# Patient Record
Sex: MALE | Race: WHITE | NOT HISPANIC OR LATINO | Employment: UNEMPLOYED
[De-identification: names, ages, dates, MRNs, and addresses within clinical notes are randomized per-mention and may not be internally consistent; named-entity substitution may affect disease eponyms.]

---

## 2022-03-09 ENCOUNTER — TRANSCRIBE ORDERS (OUTPATIENT)
Dept: OTHER | Age: 42
End: 2022-03-09

## 2022-03-09 DIAGNOSIS — K70.31 ASCITES DUE TO ALCOHOLIC CIRRHOSIS (H): Primary | ICD-10-CM

## 2022-03-23 ENCOUNTER — TELEPHONE (OUTPATIENT)
Dept: GASTROENTEROLOGY | Facility: CLINIC | Age: 42
End: 2022-03-23
Payer: COMMERCIAL

## 2022-03-23 DIAGNOSIS — K74.60 CIRRHOSIS OF LIVER (H): Primary | ICD-10-CM

## 2022-03-23 NOTE — TELEPHONE ENCOUNTER
GOLDENM for PT to call [phone] to move up the 4.19 Dr. Wade appt to 4.5 @10:00.  Labs would need to be done the day before.  We can fax that order in.

## 2022-03-23 NOTE — TELEPHONE ENCOUNTER
Dr. Chase - thanks for the heads-up. He's scheduled for April 19 with me, but I'll have my RN coordinator contact him and see if he can come in sooner (e.g. the 1st week in April).     I don't know that I can see his most recent labs in [facility], but I'm concerned about his low sodium in February - I'd suggesting following his CMP.     Feel free to call my RN coordinator, Cinthya Pickering at [phone] for any questions about potential referrals - or for urgent issues, you can call [phone] and ask to speak to the on-call Hepatologist.     Best,     Deangelo Salinas MD   ([facility] Hepatology [facility])     And:    From: Guillermo Salinas MD ([place])  Sent: 3/23/2022  10:08 AM CDT  To: Cinthya Pickering RN ([place])  Subject: FW: Referral on 3/22/2022    Cinthya - could you please reach out to this patient and see if he wants to come in sooner (e.g. the 10 AM slot on April 5)?  His labs don't look good.  Please have him go to a [facility] clinic the day prior to his visit with me for a CMP, INR, CBC, HCV Ab, Hep B sAg/Ab  Also - please have a SW contact him about getting some kind of medical coverage (e.g.  MA) if he doesn't already have this.    Thanks,  J    Will ask scheduling to please move up his hepatology visit to first week of April, if he can, as well as get labs.

## 2022-04-04 ENCOUNTER — LAB (OUTPATIENT)
Dept: LAB | Facility: CLINIC | Age: 42
End: 2022-04-04
Payer: COMMERCIAL

## 2022-04-04 DIAGNOSIS — K70.31 ALCOHOLIC CIRRHOSIS OF LIVER WITH ASCITES (H): ICD-10-CM

## 2022-04-04 DIAGNOSIS — K74.60 CIRRHOSIS OF LIVER (H): ICD-10-CM

## 2022-04-04 LAB
ERYTHROCYTE [DISTWIDTH] IN BLOOD BY AUTOMATED COUNT: 14.1 % (ref 10–15)
HCT VFR BLD AUTO: 44.1 % (ref 40–53)
HGB BLD-MCNC: 15.7 G/DL (ref 13.3–17.7)
INR PPP: 1.31 (ref 0.85–1.15)
MCH RBC QN AUTO: 36.3 PG (ref 26.5–33)
MCHC RBC AUTO-ENTMCNC: 35.6 G/DL (ref 31.5–36.5)
MCV RBC AUTO: 102 FL (ref 78–100)
PLATELET # BLD AUTO: 323 10E3/UL (ref 150–450)
RBC # BLD AUTO: 4.33 10E6/UL (ref 4.4–5.9)
WBC # BLD AUTO: 12.5 10E3/UL (ref 4–11)

## 2022-04-04 PROCEDURE — 87340 HEPATITIS B SURFACE AG IA: CPT

## 2022-04-04 PROCEDURE — 85027 COMPLETE CBC AUTOMATED: CPT

## 2022-04-04 PROCEDURE — 36415 COLL VENOUS BLD VENIPUNCTURE: CPT

## 2022-04-04 PROCEDURE — 84165 PROTEIN E-PHORESIS SERUM: CPT

## 2022-04-04 PROCEDURE — 80053 COMPREHEN METABOLIC PANEL: CPT

## 2022-04-04 PROCEDURE — 84155 ASSAY OF PROTEIN SERUM: CPT

## 2022-04-04 PROCEDURE — 82248 BILIRUBIN DIRECT: CPT

## 2022-04-04 PROCEDURE — 86706 HEP B SURFACE ANTIBODY: CPT

## 2022-04-04 PROCEDURE — 85610 PROTHROMBIN TIME: CPT

## 2022-04-04 PROCEDURE — 86803 HEPATITIS C AB TEST: CPT

## 2022-04-05 ENCOUNTER — TELEPHONE (OUTPATIENT)
Dept: GASTROENTEROLOGY | Facility: CLINIC | Age: 42
End: 2022-04-05

## 2022-04-05 ENCOUNTER — OFFICE VISIT (OUTPATIENT)
Dept: GASTROENTEROLOGY | Facility: CLINIC | Age: 42
End: 2022-04-05
Attending: FAMILY MEDICINE
Payer: COMMERCIAL

## 2022-04-05 VITALS
HEIGHT: 69 IN | BODY MASS INDEX: 28.2 KG/M2 | OXYGEN SATURATION: 98 % | DIASTOLIC BLOOD PRESSURE: 91 MMHG | HEART RATE: 130 BPM | WEIGHT: 190.4 LBS | SYSTOLIC BLOOD PRESSURE: 131 MMHG

## 2022-04-05 DIAGNOSIS — K70.31 ASCITES DUE TO ALCOHOLIC CIRRHOSIS (H): ICD-10-CM

## 2022-04-05 LAB
ALBUMIN SERPL-MCNC: 2.7 G/DL (ref 3.4–5)
ALP SERPL-CCNC: 194 U/L (ref 40–150)
ALT SERPL W P-5'-P-CCNC: 39 U/L (ref 0–70)
ANION GAP SERPL CALCULATED.3IONS-SCNC: 9 MMOL/L (ref 3–14)
AST SERPL W P-5'-P-CCNC: 59 U/L (ref 0–45)
BILIRUB DIRECT SERPL-MCNC: 0.4 MG/DL (ref 0–0.2)
BILIRUB SERPL-MCNC: 0.8 MG/DL (ref 0.2–1.3)
BUN SERPL-MCNC: 8 MG/DL (ref 7–30)
CALCIUM SERPL-MCNC: 9.5 MG/DL (ref 8.5–10.1)
CHLORIDE BLD-SCNC: 96 MMOL/L (ref 94–109)
CO2 SERPL-SCNC: 23 MMOL/L (ref 20–32)
CREAT SERPL-MCNC: 0.55 MG/DL (ref 0.66–1.25)
GFR SERPL CREATININE-BSD FRML MDRD: >90 ML/MIN/1.73M2
GLUCOSE BLD-MCNC: 177 MG/DL (ref 70–99)
HBV SURFACE AB SERPL IA-ACNC: >1000 M[IU]/ML
HBV SURFACE AG SERPL QL IA: NONREACTIVE
HCV AB SERPL QL IA: NONREACTIVE
POTASSIUM BLD-SCNC: 4.2 MMOL/L (ref 3.4–5.3)
PROT SERPL-MCNC: 9 G/DL (ref 6.8–8.8)
SODIUM SERPL-SCNC: 128 MMOL/L (ref 133–144)

## 2022-04-05 PROCEDURE — 99205 OFFICE O/P NEW HI 60 MIN: CPT | Performed by: INTERNAL MEDICINE

## 2022-04-05 RX ORDER — SPIRONOLACTONE 50 MG/1
TABLET, FILM COATED ORAL
COMMUNITY
Start: 2022-02-18

## 2022-04-05 RX ORDER — FAMOTIDINE 20 MG/1
20 TABLET, FILM COATED ORAL
COMMUNITY
Start: 2022-02-02

## 2022-04-05 RX ORDER — ACAMPROSATE CALCIUM 333 MG/1
TABLET, DELAYED RELEASE ORAL
COMMUNITY
Start: 2022-03-29

## 2022-04-05 RX ORDER — FUROSEMIDE 20 MG
TABLET ORAL
COMMUNITY
Start: 2022-02-18

## 2022-04-05 RX ORDER — EPINEPHRINE 0.3 MG/.3ML
0.3 INJECTION SUBCUTANEOUS
COMMUNITY
Start: 2021-07-01

## 2022-04-05 RX ORDER — METFORMIN HCL 500 MG
TABLET, EXTENDED RELEASE 24 HR ORAL
COMMUNITY
Start: 2021-10-27

## 2022-04-05 RX ORDER — QUETIAPINE FUMARATE 50 MG/1
TABLET, FILM COATED ORAL
COMMUNITY
Start: 2022-03-29

## 2022-04-05 RX ORDER — LORAZEPAM 0.5 MG/1
0.5 TABLET ORAL 4 TIMES DAILY PRN
COMMUNITY
Start: 2021-09-15

## 2022-04-05 ASSESSMENT — PAIN SCALES - GENERAL: PAINLEVEL: NO PAIN (0)

## 2022-04-05 NOTE — NURSING NOTE
"Chief Complaint   Patient presents with     New Patient     Acsites due to alcoholic cirrhosis       Vitals:    04/05/22 1004   BP: (!) 131/91   BP Location: Right arm   Patient Position: Sitting   Cuff Size: Adult Regular   Pulse: (!) 130   SpO2: 98%   Weight: 86.4 kg (190 lb 6.4 oz)   Height: 1.753 m (5' 9\")       Body mass index is 28.12 kg/m .    Lorna Chance MA    "

## 2022-04-05 NOTE — TELEPHONE ENCOUNTER
4/5/22 Hepatology visit with Dr. Salinas reviewed and care coordination needed includes:    Endoscopy here in the relatively near future.     Repeat MRI here (with labs and a clinic visit) in August.     Will assist patient as able in having recommended follow-up.    Of special note, sent imaging disk from Ezio' previous images from an outside facility to [facility] to be scanned into his chart.

## 2022-04-05 NOTE — PROGRESS NOTES
"[facility]       Hepatology Clinic    Date of Service: 4/5/2022       Primary Care Provider: Jeremy Chase    History of Present Illness     Mr. Oquendo is sent in consultation by Dr. Chase because of alcoholic cirrhosis with ascites. He is accompanied by his father.    The patient reports a longstanding history of substantial alcohol use.  Since his original presentation in February, he has \"cut down\" his alcohol use and now drinks approximately 5 to 6 glasses of wine daily.    The patient states that he was told that he had an enlarged liver in about 2017 when he underwent an appendectomy.  He presented in February 2022 with ascites, and has undergone several paracenteses.  He is also recently been started on spironolactone (100 mg) and Lasix (40 mg) daily.  His last paracentesis was about 4 weeks ago, and he has symptomatic ascites at present.  He has not noted lower extremity edema recently.  He also reports no history of GI bleeding or overt encephalopathy symptoms.    He reports early satiety that has worsened since the onset of his ascites.  He also notes muscle weakness.    He has a history of significant anxiety and trauma from the recent death of his brother.  He reports that he used alcohol to treat anxiety in the past.  He had been employed as a  at the [employer], but quit in July because the job is so stressful.  He lives alone, but has a supportive family.    There is no family history of liver disease, but there is a strong family history of alcoholism.  The patient recently started seeing a mental health provider close to home, and has been started on acamprosate.    I reviewed his Problem List and PMN from [facility].    Past Medical History:  No past medical history on file.    There is no problem list on file for this patient.      Surgical History:  No past surgical history on file.    Social History:  Social History     Tobacco Use     " "Smoking status: Current Every Day Smoker     Packs/day: 0.50     Smokeless tobacco: Never Used   Substance Use Topics     Alcohol use: Yes     Alcohol/week: 40.0 standard drinks     Types: 40 Glasses of wine per week     Drug use: Never       Family History:  No family history on file.    Medications:  Current Outpatient Medications   Medication     acamprosate (CAMPRAL) 333 MG EC tablet     ASPIRIN 81 PO     EPINEPHrine (ANY BX GENERIC EQUIV) 0.3 MG/0.3ML injection 2-pack     famotidine (PEPCID) 20 MG tablet     furosemide (LASIX) 20 MG tablet     LORazepam (ATIVAN) 0.5 MG tablet     metFORMIN (GLUCOPHAGE-XR) 500 MG 24 hr tablet     QUEtiapine (SEROQUEL) 50 MG tablet     spironolactone (ALDACTONE) 50 MG tablet     No current facility-administered medications for this visit.         Objective:         Vitals:    04/05/22 1004   BP: (!) 131/91   BP Location: Right arm   Patient Position: Sitting   Cuff Size: Adult Regular   Pulse: (!) 130   SpO2: 98%   Weight: 86.4 kg (190 lb 6.4 oz)   Height: 1.753 m (5' 9\")     Body mass index is 28.12 kg/m .     Physical Exam  Constitutional: Well-developed, well-nourished, in no apparent distress.    HEENT: Normocephalic. No scleral icterus. Moist oral mucosa. Poor dentition.  Cardiac:  Regular rate and rhythm.  No overt murmurs  Respiratory: Clear to auscultation bilaterally.  No wheezes or rales  GI:  Abdomen soft,  non-tender. BS present. Distended with ascites. Enlarged liver.    Skin:  Rash over forehead and face (likely acne).  No spider nevi noted.    Peripheral Vascular: No lower extremity edema.   Musculoskeletal:  ROM intact, apparently decreased muscle bulk    Psychiatric: Normal mood and affect. Behavior is normal.  Neuro: Fine tremor that may be related to some degree of alcohol withdrawal.    Labs and Diagnostic tests:    Lab Results   Component Value Date    WBC 12.5 04/04/2022    HGB 15.7 04/04/2022     04/04/2022     04/04/2022     Lab Results "   Component Value Date    INR 1.31 04/04/2022       MRI in [facility] Feb. 2022  IMPRESSION:   1. Findings consistent with hepatic cirrhosis with component acute   inflammation/hepatitis.   2. Heterogenous diffuse arterial and venous enhancement patterns throughout the   liver favored to be secondary to a chronic liver disease and acute inflammatory   process.  However, patient is likely at high risk of development of HCC and   would benefit from multiphasic screening CT or MRI following control of acute   hepatic inflammation.   3. Perihepatic adenopathy likely reactive to the underlying hepatocellular   disease.   4. Moderate perihepatic and parasplenic predominant ascites.    Hepatitis A, B, and C serologies were negative in 2017.    Assessment and Plan:    1.  Alcoholic cirrhosis with ascites.  There may also be a component of superimposed acute alcoholic steatohepatitis given the appearance of his MRI scan and his hepatomegaly today, which may improve over time.    The significance of cirrhosis was discussed at length with the patient and his father.  They understand that any level of ongoing alcohol use substantially increases his risk of further decompensation and potentially death.  I strongly urged the patient to continue to work with his providers in [facility] regarding outpatient and potentially inpatient chemical dependency treatment.  The patient states he he will continue to follow-up with these providers.  If he is able to stop drinking, there is a reasonable chance that his liver will slowly improve over time.    In regard to his ascites management, if his CMP today does not show any worrisome features, I think it would be reasonable to increase his spironolactone to 200 mg daily and Lasix 40 mg twice daily.  I would monitor his CMP at least monthly while he is on these medications (more often if there are any significant abnormalities).  He was significantly hyponatremic in March, and this will need to be  followed closely.    The risk of further decompensation, including worsening fluid accumulation, GI bleeding, encephalopathy, and liver cancer were discussed.  I am suggesting that we perform an endoscopy on him in the relatively near future to screen for significant varices, and to perform banding if these look high-risk.  Given the appearance of his MRI in February, I think it is reasonable for us to repeat this in August when he returns to see us in clinic.    He appears to be well-cared for in a can by Dr. Chase and his mental health provider.  I think it is reasonable to perform paracentesis (with IV albumin) as needed to control symptoms.  I also suggest that he be referred to a dietitian in his community regarding a low-sodium diet, as well as achieving adequate calorie protein intake.  I recommended that he gradually increase his exercise activity level to overcome the muscle atrophy that is occurred due to his inactivity and illness.    Their questions today were answered.  I will plan on seeing him back in August, but he can return sooner if needed for worsening hepatic or GI symptoms.  I would be happy to discuss the case with Dr. Chase.      About 62 minutes spent today with patient, reviewing results, and coordinating care.        Guillermo Salinas MD  Professor of Medicine  [facility]  Division of Gastroenterology, Hepatology, and Nutrition

## 2022-04-05 NOTE — PATIENT INSTRUCTIONS
It was a pleasure taking care of you today. I've included a brief summary of our discussion and care plan from today's visit below.  Please review this information with your primary care provider.  _______________________________________________________________________    My recommendations are summarized as follows:    1. Complete alcohol cessation is extremely important. Please work with your providers at home about outpatient and/or inpatient alcohol treatment.    2. Paracentesis as needed in [place].    3. Endoscopy here in the relatively near future.    4. Repeat MRI here (with labs and a clinic visit) in August.    5. Contact us if you have worsening liver or GI symptoms. If you need hospitalization, I would prefer that you were at the [facility] if you need advanced care.    6. Low salt-low sodium diet. Avoid excess water or fluid intake. You may tolerate soft or liquid foods, and frequent small meals, better than large meals. I would consider asking your physician to refer you to a dietician.      If you need any follow-up appointments, please use the following phone numbers below.    To schedule or reschedule a follow-up GI appointment, call [phone] option 1    To schedule your endoscopy procedure, call [phone] option 2    To schedule imaging, please call [phone]     To schedule your lab appointment at [facility] 1st floor lab call [phone]. Call your [place] lab directly if it is not [facility]. If you use a non-[place] lab, please let us know where to fax your lab order (call Cinthya at [phone]).      _______________________________________________________________________    Please be in touch if there are any further questions that arise following today's visit.  There are multiple ways to contact your gastroenterology care team.      During business hours, you may reach your gastroenterology RN Care Coordinator, Cinthya Pickering, at  [phone].      You can always send a secure message through SPARQCode. SPARQCode messages are answered by your nurse or doctor typically within 24 hours. Please allow extra time on weekends and holidays.     What is SPARQCode?  SPARQCode is a secure way for you to access all of your healthcare records from the [facility].  It is a web based computer program, so you can sign on to it from any location.  It also allows you to send secure messages to your care team.  I recommend signing up for SPARQCode access if you have not already done so and are comfortable with using a computer.     For urgent/emergent questions after business hours, you may reach the on-call GI Fellow by contacting the [facility]  at [phone].     How will I get the results of any tests ordered?    You will receive all of your results.  If you have signed up for [employer], any tests ordered at your visit will be available to you after your physician reviews them.  Typically this takes 1-2 weeks.  If there are urgent results that require a change in your care plan, your physician or nurse will call you to discuss the next steps.      Thank you for choosing [facility]!       Sincerely,    Guillermo Salinas MD  Professor of Medicine  [facility]  Division of Gastroenterology, Hepatology, and Nutrition

## 2022-04-06 ENCOUNTER — TELEPHONE (OUTPATIENT)
Dept: GASTROENTEROLOGY | Facility: CLINIC | Age: 42
End: 2022-04-06
Payer: COMMERCIAL

## 2022-04-06 DIAGNOSIS — Z11.59 ENCOUNTER FOR SCREENING FOR OTHER VIRAL DISEASES: Primary | ICD-10-CM

## 2022-04-06 DIAGNOSIS — K70.31 ALCOHOLIC CIRRHOSIS OF LIVER WITH ASCITES (H): Primary | ICD-10-CM

## 2022-04-06 LAB — TOTAL PROTEIN SERUM FOR ELP: 8.4 G/DL (ref 6.8–8.8)

## 2022-04-06 NOTE — TELEPHONE ENCOUNTER
Screening Questions  BlueKIND OF PREP RedLOCATION [review exclusion criteria] GreenSEDATION TYPE  1. Have you had a positive covid test in the last 90 days? N     2. Are you active on mychart? N    3. What insurance is in the chart? BCBS     3.   Ordering/Referring Provider: Guillermo Salinas MD    4. BMI 28.1 [BMI OVER 40-EXTENDED PREP]  If greater than 40 review exclusion criteria [PAC APPT IF @ [facility]]        5.  Respiratory Screening :  [If yes to any of the following HOSPITAL setting only]     Do you use daily home oxygen? N    Do you have mod to severe Obstructive Sleep Apnea? N  [OKAY @ [facility] SH PH RI]   Do you have Pulmonary Hypertension? N     Do you have UNCONTROLLED asthma? N        6.   Have you had a heart or lung transplant? N      7.   Are you currently on dialysis? N [ If yes, G-PREP & HOSPITAL setting only]     8.   Do you have chronic kidney disease? N [ If yes, G-PREP ]    9.   Have you had a stroke or Transient ischemic attack (TIA - aka  mini stroke ) within 6 months?  N (If yes, please review exclusion criteria)    10.   In the past 6 months, have you had any heart related issues including cardiomyopathy or heart attack? N           If yes, did it require cardiac stenting or other implantable device? N      11.   Do you have any implantable devices in your body (pacemaker, defib, LVAD)? N (If yes, please review exclusion criteria)    12.   Do you take nitroglycerin? N           If yes, how often?   (if yes, HOSPITAL setting ONLY)    13.   Are you currently taking any blood thinners? N           [IF YES, INFORM PATIENT TO FOLLOW UP W/ ORDERING PROVIDER FOR BRIDGING INSTRUCTIONS]     14.   Do you have a diagnosis of diabetes? Y   [ If yes, G-PREP ]    15.   [FEMALES] Are you currently pregnant?     If yes, how many weeks?     16.   Are you taking any prescription pain medications on a routine schedule?  N  [ If yes, EXTENDED PREP.] [If yes, MAC]    17.   Do you have any chemical  dependencies such as alcohol, street drugs, or methadone?  Y [If yes, MAC] ALBRECT TALKED TO PT YESTERDAY AND OK'D CS ON ORDER    18.   Do you have any history of post-traumatic stress syndrome, severe anxiety or history of psychosis?  N  [If yes, MAC]    19.   Do you have a significant intellectual disability?  N [If yes, MAC]    20.   Do you transfer independently?  Y    21.  On a regular basis do you go 3-5 days between bowel movements?    [ If yes, EXTENDED PREP.]    22.   Preferred LOCAL Pharmacy for Pre Prescription   [id number] [place] [address]      Scheduling Details      Caller :   (Please ask for phone number if not scheduled by patient)    Type of Procedure Scheduled: EGD  Which Colonoscopy Prep was Sent?:   EMILY CF PATIENTS & GROEN'S PATIENTS NEEDS EXTENDED PREP  Surgeon: DANE  Date of Procedure: 5/9  Location: [facility]      Sedation Type: CS PER [facility]  Conscious Sedation- Needs  for 6 hours after the procedure  MAC/General-Needs  for 24 hours after procedure    Pre-op Required at [facility] [place], [place] and OR for MAC sedation:Y-patient was told he needed a pre op, but it is not needed since patient is having conscious sedation-LO  (advise patient they will need a pre-op prior to procedure -)      Informed patient they will need an adult  Y  Cannot take any type of public or medical transportation alone    Pre-Pro[facility] or Ex[facility]ally: PT WILL DO CLOSE TO HOME    Confirmed Nurse will call to complete assessment Y    Additional comments:  Patient called back concerned about pre op being done within 30 days.  I informed patient that he does not need to have a pre op done since he is having conscious sedation for anesthesia.  LO

## 2022-04-06 NOTE — PROGRESS NOTES
GI STAFF    His labs yesterday showed an elevated total protein and a low albumin.    I have added on a SPEP to these blood tests.        Lab Results   Component Value Date    AST 59 04/04/2022     Lab Results   Component Value Date    ALT 39 04/04/2022     No results found for: BILICONJ   Lab Results   Component Value Date    BILITOTAL 0.8 04/04/2022     Lab Results   Component Value Date    ALBUMIN 2.7 04/04/2022     Lab Results   Component Value Date    PROTTOTAL 9.0 04/04/2022      Lab Results   Component Value Date    ALKPHOS 194 04/04/2022

## 2022-04-07 LAB
ALBUMIN SERPL ELPH-MCNC: 3.3 G/DL (ref 3.7–5.1)
ALPHA1 GLOB SERPL ELPH-MCNC: 0.4 G/DL (ref 0.2–0.4)
ALPHA2 GLOB SERPL ELPH-MCNC: 0.6 G/DL (ref 0.5–0.9)
B-GLOBULIN SERPL ELPH-MCNC: 1.1 G/DL (ref 0.6–1)
GAMMA GLOB SERPL ELPH-MCNC: 2.9 G/DL (ref 0.7–1.6)
M PROTEIN SERPL ELPH-MCNC: 0 G/DL
PROT PATTERN SERPL ELPH-IMP: ABNORMAL

## 2022-04-11 NOTE — TELEPHONE ENCOUNTER
Imaging has been scanned in from outside facility into PACs. Will notify provider and also assist in patient having recommended August follow-up.

## 2022-04-29 ENCOUNTER — TELEPHONE (OUTPATIENT)
Dept: GASTROENTEROLOGY | Facility: CLINIC | Age: 42
End: 2022-04-29

## 2022-04-29 NOTE — TELEPHONE ENCOUNTER
Attempted to contact patient regarding upcoming EGD procedure on 5.9.2022 for pre assessment questions. No answer.     Left message to return call to [phone] #2    Covid test? External?    Arrival time: 0930    Facility location: [facility]    Sedation type: CS-approved by Dr. Salinas per scheduling notes    Indication for procedure: screening for varices    Alina Jorge RN

## 2022-05-04 ENCOUNTER — TELEPHONE (OUTPATIENT)
Dept: GASTROENTEROLOGY | Facility: CLINIC | Age: 42
End: 2022-05-04
Payer: COMMERCIAL

## 2022-05-04 NOTE — TELEPHONE ENCOUNTER
Second attempt for pre-assessment prior to upcoming EGD.    No answer.  Left message to return call [phone] #2    Alina Jorge RN

## 2022-05-04 NOTE — TELEPHONE ENCOUNTER
Pre assessment questions completed for upcoming EGD procedure scheduled on 5/9/22    COVID test scheduled: Has Covid test scheduled on 5/5/22 at [facility]. The Pt has our fax number of where to send result to, he will also bring in a hard copy with him to procedure.     Reviewed procedural arrival time 9:30am and facility location [facility].    Designated  policy reviewed. Instructed to have someone stay 6 hours post procedure.     Procedure indication: Screen for varices    Prep instructions sent via Postal Mail.    Reviewed EGD prep instructions with patient.     Anticoagulation/blood thinners? None    On Metformin, Pt will hold the morning of procedure.     Electronic implanted devices? None    Patient verbalized understanding and had no questions or concerns at this time.    Prema Culver RN

## 2022-05-04 NOTE — TELEPHONE ENCOUNTER
[facility] Call Center    Reason for Call: Other: Patient returning call for procedure PA     Action Taken: Patient transferred to: Prema Culver RN    Travel Screening: Not Applicable

## 2022-05-09 ENCOUNTER — HOSPITAL ENCOUNTER (OUTPATIENT)
Facility: CLINIC | Age: 42
Discharge: HOME | End: 2022-05-09
Attending: INTERNAL MEDICINE | Admitting: INTERNAL MEDICINE
Payer: COMMERCIAL

## 2022-05-09 VITALS
BODY MASS INDEX: 26.29 KG/M2 | TEMPERATURE: 99.2 F | RESPIRATION RATE: 16 BRPM | HEIGHT: 69 IN | SYSTOLIC BLOOD PRESSURE: 107 MMHG | WEIGHT: 177.47 LBS | HEART RATE: 101 BPM | OXYGEN SATURATION: 94 % | DIASTOLIC BLOOD PRESSURE: 76 MMHG

## 2022-05-09 DIAGNOSIS — K70.31 ALCOHOLIC CIRRHOSIS OF LIVER WITH ASCITES (H): Primary | ICD-10-CM

## 2022-05-09 DIAGNOSIS — K21.9 GASTROESOPHAGEAL REFLUX DISEASE WITHOUT ESOPHAGITIS: Primary | ICD-10-CM

## 2022-05-09 LAB
BASOPHILS # BLD AUTO: 0.1 10E3/UL (ref 0–0.2)
BASOPHILS NFR BLD AUTO: 1 %
EOSINOPHIL # BLD AUTO: 0.1 10E3/UL (ref 0–0.7)
EOSINOPHIL NFR BLD AUTO: 1 %
ERYTHROCYTE [DISTWIDTH] IN BLOOD BY AUTOMATED COUNT: 13 % (ref 10–15)
HCT VFR BLD AUTO: 40 % (ref 40–53)
HGB BLD-MCNC: 14.5 G/DL (ref 13.3–17.7)
IMM GRANULOCYTES # BLD: 0.1 10E3/UL
IMM GRANULOCYTES NFR BLD: 1 %
LYMPHOCYTES # BLD AUTO: 1.8 10E3/UL (ref 0.8–5.3)
LYMPHOCYTES NFR BLD AUTO: 17 %
MCH RBC QN AUTO: 35 PG (ref 26.5–33)
MCHC RBC AUTO-ENTMCNC: 36.3 G/DL (ref 31.5–36.5)
MCV RBC AUTO: 97 FL (ref 78–100)
MONOCYTES # BLD AUTO: 1.4 10E3/UL (ref 0–1.3)
MONOCYTES NFR BLD AUTO: 14 %
NEUTROPHILS # BLD AUTO: 6.9 10E3/UL (ref 1.6–8.3)
NEUTROPHILS NFR BLD AUTO: 66 %
NRBC # BLD AUTO: 0 10E3/UL
NRBC BLD AUTO-RTO: 0 /100
PLATELET # BLD AUTO: 284 10E3/UL (ref 150–450)
RBC # BLD AUTO: 4.14 10E6/UL (ref 4.4–5.9)
UPPER GI ENDOSCOPY: NORMAL
WBC # BLD AUTO: 10.3 10E3/UL (ref 4–11)

## 2022-05-09 PROCEDURE — 250N000009 HC RX 250: Performed by: STUDENT IN AN ORGANIZED HEALTH CARE EDUCATION/TRAINING PROGRAM

## 2022-05-09 PROCEDURE — G0500 MOD SEDAT ENDO SERVICE >5YRS: HCPCS | Performed by: INTERNAL MEDICINE

## 2022-05-09 PROCEDURE — 250N000011 HC RX IP 250 OP 636: Performed by: INTERNAL MEDICINE

## 2022-05-09 PROCEDURE — 86381 MITOCHONDRIAL ANTIBODY EACH: CPT | Performed by: INTERNAL MEDICINE

## 2022-05-09 PROCEDURE — 86334 IMMUNOFIX E-PHORESIS SERUM: CPT | Mod: 26

## 2022-05-09 PROCEDURE — 86334 IMMUNOFIX E-PHORESIS SERUM: CPT | Performed by: PATHOLOGY

## 2022-05-09 PROCEDURE — 86015 ACTIN ANTIBODY EACH: CPT | Performed by: INTERNAL MEDICINE

## 2022-05-09 PROCEDURE — 43239 EGD BIOPSY SINGLE/MULTIPLE: CPT | Performed by: INTERNAL MEDICINE

## 2022-05-09 PROCEDURE — 88305 TISSUE EXAM BY PATHOLOGIST: CPT | Mod: 26 | Performed by: PATHOLOGY

## 2022-05-09 PROCEDURE — 36415 COLL VENOUS BLD VENIPUNCTURE: CPT | Performed by: INTERNAL MEDICINE

## 2022-05-09 PROCEDURE — 88305 TISSUE EXAM BY PATHOLOGIST: CPT | Mod: TC | Performed by: INTERNAL MEDICINE

## 2022-05-09 PROCEDURE — 85025 COMPLETE CBC W/AUTO DIFF WBC: CPT | Performed by: INTERNAL MEDICINE

## 2022-05-09 PROCEDURE — 43235 EGD DIAGNOSTIC BRUSH WASH: CPT | Performed by: INTERNAL MEDICINE

## 2022-05-09 RX ORDER — ONDANSETRON 4 MG/1
4 TABLET, ORALLY DISINTEGRATING ORAL EVERY 6 HOURS PRN
Status: DISCONTINUED | OUTPATIENT
Start: 2022-05-09 | End: 2022-05-09 | Stop reason: HOSPADM

## 2022-05-09 RX ORDER — NALOXONE HYDROCHLORIDE 0.4 MG/ML
0.4 INJECTION, SOLUTION INTRAMUSCULAR; INTRAVENOUS; SUBCUTANEOUS
Status: DISCONTINUED | OUTPATIENT
Start: 2022-05-09 | End: 2022-05-09 | Stop reason: HOSPADM

## 2022-05-09 RX ORDER — ALBUTEROL SULFATE 5 MG/ML
2.5 SOLUTION, NON-ORAL INHALATION EVERY 6 HOURS PRN
Status: DISCONTINUED | OUTPATIENT
Start: 2022-05-09 | End: 2022-05-09 | Stop reason: HOSPADM

## 2022-05-09 RX ORDER — FENTANYL CITRATE 50 UG/ML
INJECTION, SOLUTION INTRAMUSCULAR; INTRAVENOUS PRN
Status: COMPLETED | OUTPATIENT
Start: 2022-05-09 | End: 2022-05-09

## 2022-05-09 RX ORDER — LIDOCAINE 40 MG/G
CREAM TOPICAL
Status: DISCONTINUED | OUTPATIENT
Start: 2022-05-09 | End: 2022-05-09 | Stop reason: HOSPADM

## 2022-05-09 RX ORDER — PROCHLORPERAZINE MALEATE 10 MG
10 TABLET ORAL EVERY 6 HOURS PRN
Status: DISCONTINUED | OUTPATIENT
Start: 2022-05-09 | End: 2022-05-09 | Stop reason: HOSPADM

## 2022-05-09 RX ORDER — NALOXONE HYDROCHLORIDE 0.4 MG/ML
0.2 INJECTION, SOLUTION INTRAMUSCULAR; INTRAVENOUS; SUBCUTANEOUS
Status: DISCONTINUED | OUTPATIENT
Start: 2022-05-09 | End: 2022-05-09 | Stop reason: HOSPADM

## 2022-05-09 RX ORDER — FLUMAZENIL 0.1 MG/ML
0.2 INJECTION, SOLUTION INTRAVENOUS
Status: DISCONTINUED | OUTPATIENT
Start: 2022-05-09 | End: 2022-05-09 | Stop reason: HOSPADM

## 2022-05-09 RX ORDER — ONDANSETRON 2 MG/ML
4 INJECTION INTRAMUSCULAR; INTRAVENOUS EVERY 6 HOURS PRN
Status: DISCONTINUED | OUTPATIENT
Start: 2022-05-09 | End: 2022-05-09 | Stop reason: HOSPADM

## 2022-05-09 RX ORDER — ONDANSETRON 2 MG/ML
4 INJECTION INTRAMUSCULAR; INTRAVENOUS
Status: DISCONTINUED | OUTPATIENT
Start: 2022-05-09 | End: 2022-05-09 | Stop reason: HOSPADM

## 2022-05-09 RX ORDER — DIPHENHYDRAMINE HYDROCHLORIDE 50 MG/ML
INJECTION INTRAMUSCULAR; INTRAVENOUS PRN
Status: COMPLETED | OUTPATIENT
Start: 2022-05-09 | End: 2022-05-09

## 2022-05-09 RX ADMIN — ALBUTEROL SULFATE 2.5 MG: 2.5 SOLUTION RESPIRATORY (INHALATION) at 10:19

## 2022-05-09 RX ADMIN — DIPHENHYDRAMINE HYDROCHLORIDE 50 MG: 50 INJECTION, SOLUTION INTRAMUSCULAR; INTRAVENOUS at 11:55

## 2022-05-09 RX ADMIN — FENTANYL CITRATE 100 MCG: 50 INJECTION, SOLUTION INTRAMUSCULAR; INTRAVENOUS at 11:58

## 2022-05-09 RX ADMIN — FENTANYL CITRATE 100 MCG: 50 INJECTION, SOLUTION INTRAMUSCULAR; INTRAVENOUS at 12:02

## 2022-05-09 RX ADMIN — MIDAZOLAM 2 MG: 1 INJECTION INTRAMUSCULAR; INTRAVENOUS at 11:58

## 2022-05-09 RX ADMIN — MIDAZOLAM 2 MG: 1 INJECTION INTRAMUSCULAR; INTRAVENOUS at 12:02

## 2022-05-09 NOTE — H&P
ENDOSCOPY PRE-SEDATION H&P FOR OUTPATIENT PROCEDURES    Karsten Oquendo  [id number]  1980    Procedure: Endoscopy    Pre-procedure diagnosis: Cirrhosis 2/2 EtOH    Past medical history: No past medical history on file.  There is no problem list on file for this patient.      Past surgical history: No past surgical history on file.    No current facility-administered medications for this encounter.       Allergies   Allergen Reactions     Clindamycin Rash     Delayed reaction that occurred near the end of a 10 day course for a dental infection. No issue breathing, swallowing, only presented this rash on trunk.     Lisinopril Angioedema     Facial swelling episode, concerning for angioedema, no lip, oral, throat swelling     Sulfa Drugs      Other reaction(s): *Unknown - Pt Doesn't Remember  Patient could not recall as of 8/13/2017        History of Anesthesia/Sedation Problems: yes- bronchospasm with general anesthesia and intubation    Physical Exam:    Mental status: alert  Heart: Normal  Lung: Normal  Assessment of patient's airway: Normal  Other as pertinent for procedure: None     Lab Results   Component Value Date    WBC 12.5 04/04/2022     Lab Results   Component Value Date    RBC 4.33 04/04/2022     Lab Results   Component Value Date    HGB 15.7 04/04/2022     Lab Results   Component Value Date    HCT 44.1 04/04/2022     Lab Results   Component Value Date     04/04/2022     Lab Results   Component Value Date    MCH 36.3 04/04/2022     Lab Results   Component Value Date    MCHC 35.6 04/04/2022     Lab Results   Component Value Date    RDW 14.1 04/04/2022     Lab Results   Component Value Date     04/04/2022     INR   Date Value Ref Range Status   04/04/2022 1.31 (H) 0.85 - 1.15 Final        ASA Score: See Provation note    Mallampati score:  II - Faucial pillars and soft palate may be seen, but uvula is masked by the base of the tongue    Assessment/Plan:     The patient is an appropriate  candidate to receive sedation.    Informed consent was discussed with the patient/family, including the risks, benefits, potential complications and any alternative options associated with sedation.    Patient assessment completed just prior to sedation and while under constant observation by the provider. Condition determined to be adequate for proceeding with sedation.    The specific risks for the procedure were discussed with the patient at the time of informed consent and include but are not limited to perforation which could require surgery, missing significant neoplasm or lesion, hemorrhage and adverse sedative complication.      Joyce Neville MD

## 2022-05-09 NOTE — OR NURSING
Procedure: Upper Endoscopoy with biopsies  Sedation: Conscious sedation (200 mcg fentanyl, 4 mg versed; Benadryl)- administered prior to arrival of writer  O2: 2-5 LPM NC during procedure  Tolerated: VS stable during and post procedure. Not c/o abdominal or chest pain.  Report: Given to Delfina Lawrence RN  Pt to recovery area in stable condition, accompanied by RN    Lanette Trotter, RN

## 2022-05-10 LAB
MITOCHONDRIA M2 IGG SER-ACNC: 4 U/ML
PATH REPORT.COMMENTS IMP SPEC: NORMAL
PATH REPORT.COMMENTS IMP SPEC: NORMAL
PATH REPORT.FINAL DX SPEC: NORMAL
PATH REPORT.GROSS SPEC: NORMAL
PATH REPORT.MICROSCOPIC SPEC OTHER STN: NORMAL
PATH REPORT.RELEVANT HX SPEC: NORMAL
PHOTO IMAGE: NORMAL

## 2022-05-11 LAB
PROT PATTERN SERPL IFE-IMP: NORMAL
SMA IGG SER-ACNC: 18 UNITS

## 2022-06-06 ENCOUNTER — TELEPHONE (OUTPATIENT)
Dept: GASTROENTEROLOGY | Facility: CLINIC | Age: 42
End: 2022-06-06
Payer: COMMERCIAL

## 2022-06-06 NOTE — TELEPHONE ENCOUNTER
Ezio in need of MRI, labs, and return visit with Dr. Salinas in hepatology sometime in Aug. Will ask scheduling to please reach out to patient to schedule.

## 2022-06-07 ENCOUNTER — TELEPHONE (OUTPATIENT)
Dept: GASTROENTEROLOGY | Facility: CLINIC | Age: 42
End: 2022-06-07
Payer: COMMERCIAL

## 2022-08-01 ENCOUNTER — HOSPITAL ENCOUNTER (OUTPATIENT)
Dept: MRI IMAGING | Facility: CLINIC | Age: 42
Discharge: HOME | End: 2022-08-01
Attending: INTERNAL MEDICINE | Admitting: INTERNAL MEDICINE
Payer: COMMERCIAL

## 2022-08-01 ENCOUNTER — LAB (OUTPATIENT)
Dept: LAB | Facility: CLINIC | Age: 42
End: 2022-08-01
Payer: COMMERCIAL

## 2022-08-01 DIAGNOSIS — K70.31 ASCITES DUE TO ALCOHOLIC CIRRHOSIS (H): ICD-10-CM

## 2022-08-01 LAB
ALBUMIN SERPL-MCNC: 3.7 G/DL (ref 3.4–5)
ALP SERPL-CCNC: 153 U/L (ref 40–150)
ALT SERPL W P-5'-P-CCNC: 28 U/L (ref 0–70)
ANION GAP SERPL CALCULATED.3IONS-SCNC: 11 MMOL/L (ref 3–14)
AST SERPL W P-5'-P-CCNC: 45 U/L (ref 0–45)
BILIRUB SERPL-MCNC: 0.9 MG/DL (ref 0.2–1.3)
BUN SERPL-MCNC: 8 MG/DL (ref 7–30)
CALCIUM SERPL-MCNC: 9 MG/DL (ref 8.5–10.1)
CHLORIDE BLD-SCNC: 90 MMOL/L (ref 94–109)
CO2 SERPL-SCNC: 22 MMOL/L (ref 20–32)
CREAT SERPL-MCNC: 0.71 MG/DL (ref 0.66–1.25)
ERYTHROCYTE [DISTWIDTH] IN BLOOD BY AUTOMATED COUNT: 12.8 % (ref 10–15)
FERRITIN SERPL-MCNC: 647 NG/ML (ref 26–388)
GFR SERPL CREATININE-BSD FRML MDRD: >90 ML/MIN/1.73M2
GLUCOSE BLD-MCNC: 205 MG/DL (ref 70–99)
HCT VFR BLD AUTO: 41.8 % (ref 40–53)
HGB BLD-MCNC: 15.1 G/DL (ref 13.3–17.7)
INR PPP: 1.43 (ref 0.85–1.15)
IRON SATN MFR SERPL: 42 % (ref 15–46)
IRON SERPL-MCNC: 130 UG/DL (ref 35–180)
MCH RBC QN AUTO: 35 PG (ref 26.5–33)
MCHC RBC AUTO-ENTMCNC: 36.1 G/DL (ref 31.5–36.5)
MCV RBC AUTO: 97 FL (ref 78–100)
PLATELET # BLD AUTO: 272 10E3/UL (ref 150–450)
POTASSIUM BLD-SCNC: 4.2 MMOL/L (ref 3.4–5.3)
PROT SERPL-MCNC: 9.1 G/DL (ref 6.8–8.8)
RBC # BLD AUTO: 4.31 10E6/UL (ref 4.4–5.9)
SODIUM SERPL-SCNC: 123 MMOL/L (ref 133–144)
TIBC SERPL-MCNC: 309 UG/DL (ref 240–430)
WBC # BLD AUTO: 9.5 10E3/UL (ref 4–11)

## 2022-08-01 PROCEDURE — 74183 MRI ABD W/O CNTR FLWD CNTR: CPT

## 2022-08-01 PROCEDURE — 85027 COMPLETE CBC AUTOMATED: CPT

## 2022-08-01 PROCEDURE — 83550 IRON BINDING TEST: CPT

## 2022-08-01 PROCEDURE — 85610 PROTHROMBIN TIME: CPT

## 2022-08-01 PROCEDURE — A9585 GADOBUTROL INJECTION: HCPCS | Performed by: INTERNAL MEDICINE

## 2022-08-01 PROCEDURE — 255N000002 HC RX 255 OP 636: Performed by: INTERNAL MEDICINE

## 2022-08-01 PROCEDURE — 80053 COMPREHEN METABOLIC PANEL: CPT

## 2022-08-01 PROCEDURE — 86015 ACTIN ANTIBODY EACH: CPT | Mod: 90

## 2022-08-01 PROCEDURE — 36415 COLL VENOUS BLD VENIPUNCTURE: CPT

## 2022-08-01 PROCEDURE — 99000 SPECIMEN HANDLING OFFICE-LAB: CPT

## 2022-08-01 PROCEDURE — 82728 ASSAY OF FERRITIN: CPT

## 2022-08-01 RX ORDER — GADOBUTROL 604.72 MG/ML
10 INJECTION INTRAVENOUS ONCE
Status: COMPLETED | OUTPATIENT
Start: 2022-08-01 | End: 2022-08-01

## 2022-08-01 RX ADMIN — GADOBUTROL 8.5 ML: 604.72 INJECTION INTRAVENOUS at 13:40

## 2022-08-02 NOTE — RESULT ENCOUNTER NOTE
Mr. Oquendo:    Your MRI yesterday showed signs of cirrhosis and portal hypertension, as expected. No worrisome focal liver lesions were noted.    Going forward, I suggest that you have a liver ultrasound every 6 months to screen for liver cancer (beginning in early February 2023). It may be easier for you to have this done through your doctors in [facility], but please contact us if you would like them done through the [facility] system.    As discussed previously, I strongly recommend that you avoid all alcohol intake.    We will plan on seeing you back in clinic as scheduled in mid-August.    If you have further questions, you can contact the Gastroenterology Nurse Coordinator, Cinthya Pickering at [phone]. To schedule a clinic appointment, you can call [phone] option 1.    - Dr. Salinas    .

## 2022-08-03 LAB — SMA IGG SER-ACNC: 15 UNITS

## 2022-08-12 NOTE — RESULT ENCOUNTER NOTE
Mr. Oquendo:    These recent blood tests did not show any marked changes from your recent results.    If you have further questions, you can contact the Gastroenterology Nurse Coordinator, Cinthya Pickering at [phone]. To schedule a clinic appointment, you can call [phone] option 1.    - Dr. Salinas

## 2022-08-14 ENCOUNTER — HEALTH MAINTENANCE LETTER (OUTPATIENT)
Age: 42
End: 2022-08-14

## 2022-11-19 ENCOUNTER — HEALTH MAINTENANCE LETTER (OUTPATIENT)
Age: 42
End: 2022-11-19

## 2023-09-09 ENCOUNTER — HEALTH MAINTENANCE LETTER (OUTPATIENT)
Age: 43
End: 2023-09-09

## 2024-11-02 ENCOUNTER — HEALTH MAINTENANCE LETTER (OUTPATIENT)
Age: 44
End: 2024-11-02

## (undated) RX ORDER — FENTANYL CITRATE 50 UG/ML
INJECTION, SOLUTION INTRAMUSCULAR; INTRAVENOUS
Status: DISPENSED
Start: 2022-05-09

## (undated) RX ORDER — ALBUTEROL SULFATE 0.83 MG/ML
SOLUTION RESPIRATORY (INHALATION)
Status: DISPENSED
Start: 2022-05-09